# Patient Record
Sex: MALE | Race: OTHER
[De-identification: names, ages, dates, MRNs, and addresses within clinical notes are randomized per-mention and may not be internally consistent; named-entity substitution may affect disease eponyms.]

---

## 2018-11-09 ENCOUNTER — HOSPITAL ENCOUNTER (INPATIENT)
Dept: HOSPITAL 74 - YASAS | Age: 43
LOS: 4 days | Discharge: LEFT BEFORE DISCHARGE | DRG: 894 | End: 2018-11-13
Attending: INTERNAL MEDICINE | Admitting: INTERNAL MEDICINE
Payer: COMMERCIAL

## 2018-11-09 VITALS — BODY MASS INDEX: 20.2 KG/M2

## 2018-11-09 DIAGNOSIS — F17.210: ICD-10-CM

## 2018-11-09 DIAGNOSIS — F10.230: Primary | ICD-10-CM

## 2018-11-09 DIAGNOSIS — F19.24: ICD-10-CM

## 2018-11-09 DIAGNOSIS — F14.20: ICD-10-CM

## 2018-11-09 DIAGNOSIS — B18.2: ICD-10-CM

## 2018-11-09 DIAGNOSIS — F13.230: ICD-10-CM

## 2018-11-09 DIAGNOSIS — I45.81: ICD-10-CM

## 2018-11-09 DIAGNOSIS — F11.20: ICD-10-CM

## 2018-11-09 PROCEDURE — HZ2ZZZZ DETOXIFICATION SERVICES FOR SUBSTANCE ABUSE TREATMENT: ICD-10-PCS | Performed by: INTERNAL MEDICINE

## 2018-11-09 RX ADMIN — Medication SCH MG: at 22:22

## 2018-11-09 NOTE — CONSULT
[facility] Psychiatric Consult





- Data


Date of interview: 11/09/18


Admission source: [facility]


Identifying data: Patient is a 43 year old  male, father of one, 

domiciled, and employed as a [employer]. This is one of multiple admissions for 

patient. Patient seeking detox for alcohol and cocaine dependence.


Substance Abuse History: Alcohol/beer- 4-5 beers daily.  Cocaine- 2 bags daily.

  CIgarette- 1/2 pack daily


Psychiatric History: Writer called to speak to patient in [facility] due to risk of 

suicidality.  Patient denies h/o psychiatric hospitalization, outpatient care, 

and suicide attempt. Pt. is currently in the methadone program at the [facility] in the [place], [place]. Patient denies psychotic symptoms. No psychosis noted. 

Patient stated " I am currently going through a phase of feeeling down because 

i am using and i am here to get help." Patient reports poor sleep and a lost of 

7-10 pounds in 2-3 months. He denies amotivation, anhedonia, and suicidal 

ideation. HIs protective factors is his mother, wife, and daughter. Patient 

denies urges or thoughts to hurt himself or others. Patient is stable for 

admission to detox.


Physical/Sexual Abuse/Trauma History: denies.





Mental Status Exam





- Mental Status Exam


Alert and Oriented to: Time, Place, Person


Cognitive Function: Good


Patient Appearance: Well Groomed


Mood: Hopeful, Euthymic


Affect: Appropriate, Mood Congruent


Patient Behavior: Appropriate, Cooperative


Speech Pattern: Clear, Appropriate


Voice Loudness: Normal


Thought Process: Intact, Goal Oriented


Hallucinations: Denies


Suicidal Ideation: Denies


Homicidal Ideation: Denies


Insight/Judgement: Poor


Sleep: Poorly


Appetite: Fair


Muscle strength/Tone: Normal


Gait/Station: Normal





Psychiatric Findings





- Problem List (Axis 1, 2,3)


(1) Substance induced mood disorder


Current Visit: Yes   Status: Acute   





(2) Alcohol dependence


Current Visit: Yes   Status: Acute   





(3) Cocaine dependence


Current Visit: Yes   Status: Acute   





- Initial Treatment Plan


Initial Treatment Plan: Psychoeducation provided. Detoxification in progress. 

Patient informed that melatonin 5mg will be ordered for insomnia.

## 2018-11-09 NOTE — HP
CIWA Score


Nausea/Vomitin-No Nausea/No Vomiting


Muscle Tremors: 4-Moderate,w/Arms Extend


Anxiety: 2


Agitation: 0-Normal Activity


Paroxysmal Sweats: 1-Minimal Palms Moist


Orientation: 1-Uncertain about Date


Tacttile Disturbances: 0-None


Auditory Disturbances: 0-None


Visual Disturbances: 0-None


Headache: 2-Mild


CIWA-Ar Total Score: 10





- Admission Criteria


[facility] Guidelines: Admission for Medically Managed Detox: 


Requires at least one of the followin. CIWA greater than 12


2. Seizures within the past 24 hours


3. Delirium tremens within the past 24 hours


4. Hallucinations within the past 24 hours


5. Acute intervention needed for co  occurring medical disorder


6. Acute intervention needed for co  occurring psychiatric disorder


7. Severe withdrawal that cannot be handled at a lower level of care (continued


    vomiting, continued diarrhea, abnormal vital signs) requiring intravenous


    medication and/or fluids


8. Pregnancy








Admission ROS [facility]





- [facility]


Chief Complaint: 





Having alcohol and Xanax withdrawal. Here for detox.


Allergies/Adverse Reactions: 


 Allergies











Allergy/AdvReac Type Severity Reaction Status Date / Time


 


No Known Allergies Allergy   Verified 16 12:00











History of Present Illness: 


 Alcohol use began at age 20.


Cocaine use began at age 20.


Nicotine use began at age 14.


Heroin use began at age 16. Has been on Methadone maintenance x 11 years. 


Benzodiazepine use began at age 40.





Denies hx of seizures, blackouts or overdose.





Longest length of sobriety is 5 years.








Search Terms: Alejandro Grier, 1975 


Search Date: 2018 07:01:46 PM 


The Drug Utilization Report below displays all of the controlled substance 

prescriptions, if any, that your patient has filled in the last twelve months. 

The information displayed on this report is compiled from pharmacy submissions 

to the Department, and accurately reflects the information as submitted by the 

pharmacies.


This report was requested by: Leydi Foreman | Reference #: [id number] 


There are no results for the search terms that you entered.











Exam Limitations: No Limitations





- Ebola screening


Have you traveled outside of the country in the last 21 days: No


Have you had contact with anyone from an Ebola affected area: No


Have you been sick,other than usual withdrawal symptoms: No


Do you have a fever: No





- Review of Systems


Constitutional: Diaphoresis (Some sweating.), Changes in sleep (Difficulty 

falling asleep.)


EENT: reports: Blurred Vision (Wears glasses)


Respiratory: reports: No Symptoms reported


Cardiac: reports: No Symptoms Reported


GI: reports: Poor Appetite


: reports: No Symptoms Reported


Musculoskeletal: reports: No Symptoms Reported


Integumentary: reports: No Symptoms Reported


Neuro: reports: Headache (Mild)


Endocrine: reports: Increased Thirst


Hematology: reports: No Symptoms Reported


Psychiatric: reports: Judgement Intact, Mood/Affect Appropiate, Orientated x3 (

Off date by 1 day), Anxious





Patient History





- Patient Medical History


Hx Anemia: No


Hx Asthma: No


Hx Chronic Obstructive Pulmonary Disease (COPD): No


Hx Cardiac Disorders: No


Hx Hypertension: No


Hx Hypercholesterolemia: No


HX Cerebrovascular Accident: No


Hx Seizures: No


Hx Diabetes: No


Hx Gastrointestinal Disorders: No


Hx Genitourinary Disorders: No


Hx Sexually Transmitted Disorders: No


Hx Renal Disease (ESRD): No


Hx Thyroid Disease: No


Hx Human Immunodeficiency Virus (HIV): No (NEGATIVE HX)


Hx Hepatitis C: Yes ("BUT INACTIVE")


Hx Depression: Yes (NOT CURRENTLY ON MED)


Hx Suicide Attempt: No (DENIES)


Hx Bipolar Disorder: No


Hx Schizophrenia: No





- Patient Surgical History


Past Surgical History: Yes


Hx Orthopedic Surgery: Yes (Fx mandible IN )


Anesthesia Reaction: No





- PPD History


Previous Implant?: Yes


Documented Results: Negative w/proof


Implanted On Prior R Admission?: Yes


Date: 16


PPD to be Administered?: Yes





- Smoking Cessation


Smoking history: Current every day smoker


Have you smoked in the past 12 months: Yes


Aproximately how many cigarettes per day: 10


Cigars Per Day: 0


Hx Chewing Tobacco Use: No


Initiated information on smoking cessation: Yes


'Breaking Loose' booklet given: 18





- Substance & Tx. History


Hx Alcohol Use: Yes


Hx Substance Use: Yes


Substance Use Type: Alcohol, Cocaine, Heroin


Hx Substance Use Treatment: Yes (detox, rehab, currently on MMTP program)





- Substances Abused


  ** Alcohol


Route: Oral


Frequency: Daily


Amount used: 1 pint of hennesey, 12 -12oz cans of beer


Age of first use: 20


Date of Last Use: 18





  ** Alprazolam (Xanax)


Route: Oral


Frequency: 1-2 times per week


Amount used: 2mg


Age of first use: 40


Date of Last Use: 18





  ** Cocaine


Route: Injection


Frequency: 3-6 times per week


Amount used: $60


Age of first use: 20


Date of Last Use: 18





Family Disease History





- Family Disease History


Family Disease History: Diabetes: Mother (HTN & DEPRESSION), Other: Father, 

Mother





Admission Physical Exam [facility]





- Vital Signs


Vital Signs: 


 Vital Signs - 24 hr











  18





  14:27


 


Temperature 97.6 F


 


Pulse Rate 64


 


Respiratory 18





Rate 


 


Blood Pressure 120/74














- Physical


General Appearance: Yes: Mild Distress, Tremorous, Sweating, Anxious


HEENTM: Yes: EOMI, Hearing grossly Normal, Normocephalic, ANABELLE, Pharynx Normal


Respiratory: Yes: Chest Non-Tender, Lungs Clear, Normal Breath Sounds, No 

Respiratory Distress


Neck: Yes: No masses,lesions,Nodules, Supple


Breast: Yes: Breast Exam Deferred


Cardiology: Yes: Regular Rhythm, Regular Rate, S1, S2, Other (Prolonged QT 

interval.)


Abdominal: Yes: Flat, Soft, Increased Bowel Sounds, Other (Generalized 

tenderness upon paplation. No guarding or rebound tenderness.)


Genitourinary: Yes: Within Normal Limits


Back: Yes: Normal Inspection


Musculoskeletal: Yes: full range of Motion, Gait Steady


Extremities: Yes: Normal Capillary Refill, Normal Range of Motion, Non-Tender, 

Tremors (Gross tremors of hands upon extension)


Neurological: Yes: CNs II-XII NML intact, Alert, Motor Strength 5/5, Normal Mood

/Affect


Integumentary: Yes: Normal Color, Dry, Warm


Lymphatic: Yes: Within Normal Limits





- Diagnostic


(1) Alcohol dependence with uncomplicated withdrawal


Current Visit: Yes   Status: Acute   





(2) Methadone maintenance therapy patient


Current Visit: Yes   Status: Chronic   





(3) Prolonged Q-T interval on ECG


Current Visit: Yes   Status: Chronic   





(4) Cocaine dependence, uncomplicated


Current Visit: Yes   Status: Chronic   





(5) Nicotine dependence


Current Visit: Yes   Status: Chronic   


Qualifiers: 


   Nicotine product type: cigarettes   Substance use status: uncomplicated   

Qualified Code(s): F17.210 - Nicotine dependence, cigarettes, uncomplicated   





(6) Sedative, hypnotic or anxiolytic dependence with withdrawal, uncomplicated


Current Visit: Yes   Status: Acute   





Cleared for Admission [facility]





- Detox or Rehab


[facility] Level of Care: Medically Supervised


Detox Regimen/Protocol: Librium





[facility] Breath Alcohol Content


Breath Alcohol Content: 0





Urine Drug Screen





- Results


Drug Screen Negative: No


Urine Drug Screen Results: MTD-Methadone

## 2018-11-10 LAB
ALBUMIN SERPL-MCNC: 3.6 G/DL (ref 3.4–5)
ALP SERPL-CCNC: 73 U/L (ref 45–117)
ALT SERPL-CCNC: 44 U/L (ref 13–61)
ANION GAP SERPL CALC-SCNC: 4 MMOL/L (ref 8–16)
AST SERPL-CCNC: 122 U/L (ref 15–37)
BILIRUB SERPL-MCNC: 0.5 MG/DL (ref 0.2–1)
BUN SERPL-MCNC: 14 MG/DL (ref 7–18)
CALCIUM SERPL-MCNC: 8.9 MG/DL (ref 8.5–10.1)
CHLORIDE SERPL-SCNC: 100 MMOL/L (ref 98–107)
CO2 SERPL-SCNC: 32 MMOL/L (ref 21–32)
CREAT SERPL-MCNC: 0.8 MG/DL (ref 0.55–1.3)
DEPRECATED RDW RBC AUTO: 13 % (ref 11.9–15.9)
GLUCOSE SERPL-MCNC: 86 MG/DL (ref 74–106)
HCT VFR BLD CALC: 37 % (ref 35.4–49)
HGB BLD-MCNC: 12 GM/DL (ref 11.7–16.9)
MCH RBC QN AUTO: 27.5 PG (ref 25.7–33.7)
MCHC RBC AUTO-ENTMCNC: 32.4 G/DL (ref 32–35.9)
MCV RBC: 84.8 FL (ref 80–96)
PLATELET # BLD AUTO: 301 K/MM3 (ref 134–434)
PMV BLD: 7.7 FL (ref 7.5–11.1)
POTASSIUM SERPLBLD-SCNC: 4.4 MMOL/L (ref 3.5–5.1)
PROT SERPL-MCNC: 6.8 G/DL (ref 6.4–8.2)
RBC # BLD AUTO: 4.37 M/MM3 (ref 4–5.6)
SODIUM SERPL-SCNC: 136 MMOL/L (ref 136–145)
WBC # BLD AUTO: 6.9 K/MM3 (ref 4–10)

## 2018-11-10 RX ADMIN — NICOTINE SCH MG: 21 PATCH TRANSDERMAL at 10:14

## 2018-11-10 RX ADMIN — MIRTAZAPINE SCH MG: 15 TABLET, FILM COATED ORAL at 22:40

## 2018-11-10 RX ADMIN — METHADONE HYDROCHLORIDE SCH MG: 40 TABLET ORAL at 05:43

## 2018-11-10 RX ADMIN — Medication SCH MG: at 22:39

## 2018-11-10 RX ADMIN — Medication SCH TAB: at 10:13

## 2018-11-10 NOTE — PN
[facility] Progress Note


Note: 





Psychiatry


Attending's note : 


Approached by the patient.


Complaint : insomnia.


Melatonin ineffective.


Sleep hygiene discussed with patient.


Hypnotic medications revisited.


Mr Grier declines :


trazodone, seroquel, tricyclics.


Offered mirtazapine. 


Made aware of side effects/benefits.


Agrees to take remeron 15 mg po hs.


Will follow response.

## 2018-11-10 NOTE — PN
S CIWA





- CIWA Score


Nausea/Vomitin-No Nausea/No Vomiting


Muscle Tremors: 3


Anxiety: 4-Mod. Anxious/Guarded


Agitation: 4-Moderately Restless


Paroxysmal Sweats: 1-Minimal Palms Moist


Orientation: 0-Oriented


Tacttile Disturbances: 0-None


Auditory Disturbances: 0-None


Visual Disturbances: 0-None


Headache: 0-None Present


CIWA-Ar Total Score: 12





B[facility] Progress Note (SOAP)


Subjective: 





ANXIETY,IRRITABILITY,INTERMITTENT SLEEP.


Objective: 





11/10/18 12:07


 Vital Signs











  11/10/18 11/10/18





  06:20 11:02


 


Temperature 97.4 F L 97.5 F L


 


Pulse Rate 49 L 53 L


 


Respiratory 18 16





Rate  


 


Blood Pressure 104/62 109/65








 Laboratory Tests











  11/10/18 11/10/18 11/10/18





  07:30 07:30 07:30


 


WBC  6.9  


 


RBC  4.37  


 


Hgb  12.0  


 


Hct  37.0  


 


MCV  84.8  


 


MCH  27.5  


 


MCHC  32.4  


 


RDW  13.0  


 


Plt Count  301  D  


 


MPV  7.7  


 


Sodium   136 


 


Potassium   4.4 


 


Chloride   100 


 


Carbon Dioxide   32 


 


Anion Gap   4 L 


 


BUN   14 


 


Creatinine   0.8 


 


Creat Clearance w eGFR   > 60 


 


Random Glucose   86 


 


Calcium   8.9 


 


Total Bilirubin   0.5 


 


AST   122 H 


 


ALT   44 


 


Alkaline Phosphatase   73 


 


Total Protein   6.8 


 


Albumin   3.6 


 


RPR Titer    Nonreactive








UA PENDING


Assessment: 





11/10/18 12:07


WITHDRAWAL SX


Plan: 





CONTINUE DETOX


INCREASE PO FLUIDS.

## 2018-11-11 RX ADMIN — MIRTAZAPINE SCH MG: 15 TABLET, FILM COATED ORAL at 22:08

## 2018-11-11 RX ADMIN — Medication SCH MG: at 22:08

## 2018-11-11 RX ADMIN — NICOTINE SCH MG: 21 PATCH TRANSDERMAL at 10:14

## 2018-11-11 RX ADMIN — IBUPROFEN PRN MG: 400 TABLET, FILM COATED ORAL at 22:10

## 2018-11-11 RX ADMIN — Medication SCH TAB: at 10:14

## 2018-11-11 RX ADMIN — METHADONE HYDROCHLORIDE SCH MG: 40 TABLET ORAL at 05:51

## 2018-11-11 NOTE — EKG
Test Reason : 

Blood Pressure : ***/*** mmHG

Vent. Rate : 057 BPM     Atrial Rate : 057 BPM

   P-R Int : 158 ms          QRS Dur : 086 ms

    QT Int : 476 ms       P-R-T Axes : 000 021 034 degrees

   QTc Int : 463 ms

 

SINUS BRADYCARDIA

OTHERWISE NORMAL ECG

NO PREVIOUS ECGS AVAILABLE

Confirmed by PAU RAMIREZ MD ([id number]) on 11/11/2018 7:32:28 PM

 

Referred By:             Confirmed By:PAU RAMIREZ MD

## 2018-11-11 NOTE — PN
S CIWA





- CIWA Score


Nausea/Vomitin-No Nausea/No Vomiting


Muscle Tremors: 2


Anxiety: 4-Mod. Anxious/Guarded


Agitation: 2


Paroxysmal Sweats: 2


Orientation: 0-Oriented


Tacttile Disturbances: 0-None


Auditory Disturbances: 0-None


Visual Disturbances: 0-None


Headache: 0-None Present


CIWA-Ar Total Score: 10





BHS Progress Note (SOAP)


Subjective: 





Anxious, interrupted sleep. Patient requested to see psychiatrist because his 

sleeping medication ineffective


Objective: 





18 16:37


 Last Vital Signs











Temp Pulse Resp BP Pulse Ox


 


 97.0 F L  65   18   101/56 L   


 


 18 15:00  18 15:00  18 15:00  18 15:00   








 Laboratory Tests











  11/10/18 11/10/18 11/10/18





  07:30 07:30 07:30


 


WBC  6.9  


 


RBC  4.37  


 


Hgb  12.0  


 


Hct  37.0  


 


MCV  84.8  


 


MCH  27.5  


 


MCHC  32.4  


 


RDW  13.0  


 


Plt Count  301  D  


 


MPV  7.7  


 


Sodium   136 


 


Potassium   4.4 


 


Chloride   100 


 


Carbon Dioxide   32 


 


Anion Gap   4 L 


 


BUN   14 


 


Creatinine   0.8 


 


Creat Clearance w eGFR   > 60 


 


Random Glucose   86 


 


Calcium   8.9 


 


Total Bilirubin   0.5 


 


AST   122 H 


 


ALT   44 


 


Alkaline Phosphatase   73 


 


Total Protein   6.8 


 


Albumin   3.6 


 


RPR Titer    Nonreactive








Labs reviewed


Assessment: 





18 16:38


Withdrawal sxs


Plan: 





Continue detox


Encouraged PO water intake

## 2018-11-12 LAB
APPEARANCE UR: CLEAR
BACTERIA #/AREA URNS HPF: (no result) /HPF
BILIRUB UR STRIP.AUTO-MCNC: NEGATIVE MG/DL
CAOX CRY URNS QL MICRO: (no result) /HPF
COLOR UR: YELLOW
EPITH CASTS URNS QL MICRO: (no result) /HPF
KETONES UR QL STRIP: NEGATIVE
LEUKOCYTE ESTERASE UR QL STRIP.AUTO: (no result)
MUCOUS THREADS URNS QL MICRO: (no result)
NITRITE UR QL STRIP: NEGATIVE
PH UR: 5 [PH] (ref 5–8)
PROT UR QL STRIP: NEGATIVE
PROT UR QL STRIP: NEGATIVE
SP GR UR: 1.02 (ref 1.01–1.03)
UROBILINOGEN UR STRIP-MCNC: 2 MG/DL (ref 0.2–1)

## 2018-11-12 RX ADMIN — Medication SCH TAB: at 10:32

## 2018-11-12 RX ADMIN — Medication SCH MG: at 22:10

## 2018-11-12 RX ADMIN — NICOTINE SCH MG: 21 PATCH TRANSDERMAL at 10:32

## 2018-11-12 RX ADMIN — MIRTAZAPINE SCH MG: 15 TABLET, FILM COATED ORAL at 22:10

## 2018-11-12 RX ADMIN — IBUPROFEN PRN MG: 400 TABLET, FILM COATED ORAL at 15:15

## 2018-11-12 RX ADMIN — METHADONE HYDROCHLORIDE SCH MG: 40 TABLET ORAL at 06:55

## 2018-11-12 NOTE — PN
BHS Progress Note (SOAP)


Subjective: 





interrupted sleep


tremors


Objective: 





11/12/18 17:25


A & O x 3


 Vital Signs











Temperature  97.1 F L  11/12/18 13:24


 


Pulse Rate  82   11/12/18 13:24


 


Respiratory Rate  18   11/12/18 13:24


 


Blood Pressure  120/76   11/12/18 13:24


 


O2 Sat by Pulse Oximetry (%)      











Assessment: 





11/12/18 17:25


withdrawal sx


Plan: 





continue detox

## 2018-11-13 VITALS — DIASTOLIC BLOOD PRESSURE: 70 MMHG | HEART RATE: 62 BPM | TEMPERATURE: 97.3 F | SYSTOLIC BLOOD PRESSURE: 116 MMHG

## 2018-11-13 RX ADMIN — IBUPROFEN PRN MG: 400 TABLET, FILM COATED ORAL at 02:11

## 2018-11-13 RX ADMIN — METHADONE HYDROCHLORIDE SCH MG: 40 TABLET ORAL at 05:50

## 2018-11-13 NOTE — DS
[facility] Detox Discharge Summary


Admission Date: 


11/09/18








- History


Present History: Alcohol Dependence, Cocaine Dependence, Sedative Dependence, 

MMTP


Pertinent Past History: 





Alcohol dependence





Opioid dependence on agonist





Nicotine dependence





Cocaine dependence





Sedative dependence





- Physical Exam Results


Vital Signs: 


 Vital Signs











Temperature  97.3 F L  11/13/18 06:42


 


Pulse Rate  62   11/13/18 06:42


 


Respiratory Rate  18   11/13/18 06:42


 


Blood Pressure  116/70   11/13/18 06:42


 


O2 Sat by Pulse Oximetry (%)      











Pertinent Admission Physical Exam Findings: 





Withdrawal sxs





 Laboratory Tests











  11/10/18 11/10/18 11/10/18





  07:30 07:30 07:30


 


WBC  6.9  


 


RBC  4.37  


 


Hgb  12.0  


 


Hct  37.0  


 


MCV  84.8  


 


MCH  27.5  


 


MCHC  32.4  


 


RDW  13.0  


 


Plt Count  301  D  


 


MPV  7.7  


 


Sodium   136 


 


Potassium   4.4 


 


Chloride   100 


 


Carbon Dioxide   32 


 


Anion Gap   4 L 


 


BUN   14 


 


Creatinine   0.8 


 


Creat Clearance w eGFR   > 60 


 


Random Glucose   86 


 


Calcium   8.9 


 


Total Bilirubin   0.5 


 


AST   122 H 


 


ALT   44 


 


Alkaline Phosphatase   73 


 


Total Protein   6.8 


 


Albumin   3.6 


 


Urine Color   


 


Urine Appearance   


 


Urine pH   


 


Ur Specific Gravity   


 


Urine Protein   


 


Urine Glucose (UA)   


 


Urine Ketones   


 


Urine Blood   


 


Urine Nitrite   


 


Urine Bilirubin   


 


Urine Urobilinogen   


 


Ur Leukocyte Esterase   


 


Urine WBC (Auto)   


 


Urine RBC (Auto)   


 


Ur Epithelial Cells   


 


Calcium Oxalate Crystal   


 


Urine Bacteria   


 


Urine Mucus   


 


RPR Titer    Nonreactive














  11/12/18





  11:10


 


WBC 


 


RBC 


 


Hgb 


 


Hct 


 


MCV 


 


MCH 


 


MCHC 


 


RDW 


 


Plt Count 


 


MPV 


 


Sodium 


 


Potassium 


 


Chloride 


 


Carbon Dioxide 


 


Anion Gap 


 


BUN 


 


Creatinine 


 


Creat Clearance w eGFR 


 


Random Glucose 


 


Calcium 


 


Total Bilirubin 


 


AST 


 


ALT 


 


Alkaline Phosphatase 


 


Total Protein 


 


Albumin 


 


Urine Color  Yellow


 


Urine Appearance  Clear


 


Urine pH  5.0  D


 


Ur Specific Gravity  1.023


 


Urine Protein  Negative


 


Urine Glucose (UA)  Negative


 


Urine Ketones  Negative


 


Urine Blood  Negative


 


Urine Nitrite  Negative


 


Urine Bilirubin  Negative


 


Urine Urobilinogen  2.0


 


Ur Leukocyte Esterase  Trace


 


Urine WBC (Auto)  <1


 


Urine RBC (Auto)  1


 


Ur Epithelial Cells  Rare


 


Calcium Oxalate Crystal  Rare


 


Urine Bacteria  Rare


 


Urine Mucus  Rare


 


RPR Titer 








Labs reviewed





- Treatment


Hospital Course: Detox Protocol Followed, Detoxed Safely, Responded well, 

Discharged Condition Good





- Medication


Discharge Medications: 


Ambulatory Orders





NK [No Known Home Medication]  05/04/16 











- Diagnosis


(1) Alcohol dependence with uncomplicated withdrawal


Status: Acute   





(2) Cocaine dependence


Status: Chronic   





(3) Sedative, hypnotic or anxiolytic dependence with withdrawal, uncomplicated


Status: Acute   





(4) Methadone maintenance therapy patient


Status: Chronic   





(5) Nicotine dependence


Status: Chronic   


Qualifiers: 


   Nicotine product type: cigarettes   Substance use status: uncomplicated   

Qualified Code(s): F17.210 - Nicotine dependence, cigarettes, uncomplicated   





- AMA


Did Patient Leave Against Medical Advice: No (F/U with your PCP within 1-2 weeks

)

## 2022-10-17 ENCOUNTER — HOSPITAL ENCOUNTER (INPATIENT)
Dept: HOSPITAL 74 - YASAS | Age: 47
LOS: 5 days | Discharge: HOME | DRG: 773 | End: 2022-10-22
Attending: SURGERY | Admitting: ALLERGY & IMMUNOLOGY
Payer: COMMERCIAL

## 2022-10-17 VITALS — BODY MASS INDEX: 24.4 KG/M2

## 2022-10-17 DIAGNOSIS — F32.A: ICD-10-CM

## 2022-10-17 DIAGNOSIS — F19.24: ICD-10-CM

## 2022-10-17 DIAGNOSIS — F10.230: Primary | ICD-10-CM

## 2022-10-17 DIAGNOSIS — F14.20: ICD-10-CM

## 2022-10-17 DIAGNOSIS — F17.210: ICD-10-CM

## 2022-10-17 DIAGNOSIS — F41.9: ICD-10-CM

## 2022-10-17 DIAGNOSIS — F13.230: ICD-10-CM

## 2022-10-17 DIAGNOSIS — F11.20: ICD-10-CM

## 2022-10-17 LAB
ALBUMIN SERPL-MCNC: 4 G/DL (ref 3.4–5)
ALP SERPL-CCNC: 89 U/L (ref 45–117)
ALT SERPL-CCNC: 35 U/L (ref 13–61)
ANION GAP SERPL CALC-SCNC: 8 MMOL/L (ref 8–16)
AST SERPL-CCNC: 24 U/L (ref 15–37)
BILIRUB SERPL-MCNC: 0.6 MG/DL (ref 0.2–1)
BUN SERPL-MCNC: 22 MG/DL (ref 7–18)
CALCIUM SERPL-MCNC: 10 MG/DL (ref 8.5–10.1)
CHLORIDE SERPL-SCNC: 105 MMOL/L (ref 98–107)
CO2 SERPL-SCNC: 29 MMOL/L (ref 21–32)
CREAT SERPL-MCNC: 0.9 MG/DL (ref 0.55–1.3)
DEPRECATED RDW RBC AUTO: 13.1 % (ref 11.9–15.9)
GLUCOSE SERPL-MCNC: 100 MG/DL (ref 74–106)
HCT VFR BLD CALC: 37.1 % (ref 35.4–49)
HGB BLD-MCNC: 12 GM/DL (ref 11.7–16.9)
MCH RBC QN AUTO: 27.1 PG (ref 25.7–33.7)
MCHC RBC AUTO-ENTMCNC: 32.4 G/DL (ref 32–35.9)
MCV RBC: 83.8 FL (ref 80–96)
PLATELET # BLD AUTO: 380 10^3/UL (ref 134–434)
PMV BLD: 8.2 FL (ref 7.5–11.1)
PROT SERPL-MCNC: 7.4 G/DL (ref 6.4–8.2)
RBC # BLD AUTO: 4.43 M/MM3 (ref 4–5.6)
SODIUM SERPL-SCNC: 142 MMOL/L (ref 136–145)
WBC # BLD AUTO: 6.1 K/MM3 (ref 4–10)

## 2022-10-17 PROCEDURE — U0003 INFECTIOUS AGENT DETECTION BY NUCLEIC ACID (DNA OR RNA); SEVERE ACUTE RESPIRATORY SYNDROME CORONAVIRUS 2 (SARS-COV-2) (CORONAVIRUS DISEASE [COVID-19]), AMPLIFIED PROBE TECHNIQUE, MAKING USE OF HIGH THROUGHPUT TECHNOLOGIES AS DESCRIBED BY CMS-2020-01-R: HCPCS

## 2022-10-17 PROCEDURE — U0005 INFEC AGEN DETEC AMPLI PROBE: HCPCS

## 2022-10-17 PROCEDURE — HZ2ZZZZ DETOXIFICATION SERVICES FOR SUBSTANCE ABUSE TREATMENT: ICD-10-PCS | Performed by: SURGERY

## 2022-10-17 RX ADMIN — NICOTINE SCH: 7 PATCH TRANSDERMAL at 12:36

## 2022-10-17 RX ADMIN — Medication SCH TAB: at 12:36

## 2022-10-17 RX ADMIN — Medication SCH MG: at 22:30

## 2022-10-18 RX ADMIN — Medication SCH MG: at 22:27

## 2022-10-18 RX ADMIN — Medication SCH MG: at 22:26

## 2022-10-18 RX ADMIN — Medication SCH TAB: at 10:20

## 2022-10-18 RX ADMIN — NICOTINE SCH: 7 PATCH TRANSDERMAL at 10:21

## 2022-10-19 RX ADMIN — Medication SCH MG: at 22:13

## 2022-10-19 RX ADMIN — Medication SCH TAB: at 10:33

## 2022-10-19 RX ADMIN — Medication SCH MG: at 22:14

## 2022-10-19 RX ADMIN — METHOCARBAMOL PRN MG: 500 TABLET ORAL at 22:12

## 2022-10-19 RX ADMIN — NICOTINE SCH: 7 PATCH TRANSDERMAL at 10:33

## 2022-10-20 RX ADMIN — Medication SCH TAB: at 10:39

## 2022-10-20 RX ADMIN — IBUPROFEN PRN MG: 600 TABLET, FILM COATED ORAL at 02:31

## 2022-10-20 RX ADMIN — IBUPROFEN PRN MG: 600 TABLET, FILM COATED ORAL at 15:59

## 2022-10-20 RX ADMIN — Medication SCH MG: at 22:25

## 2022-10-20 RX ADMIN — METHOCARBAMOL PRN MG: 500 TABLET ORAL at 22:25

## 2022-10-20 RX ADMIN — NICOTINE SCH: 7 PATCH TRANSDERMAL at 10:39

## 2022-10-21 VITALS — RESPIRATION RATE: 16 BRPM

## 2022-10-21 RX ADMIN — IBUPROFEN PRN MG: 600 TABLET, FILM COATED ORAL at 20:35

## 2022-10-21 RX ADMIN — IBUPROFEN PRN MG: 600 TABLET, FILM COATED ORAL at 01:23

## 2022-10-21 RX ADMIN — NICOTINE SCH MG: 7 PATCH TRANSDERMAL at 10:11

## 2022-10-21 RX ADMIN — Medication SCH TAB: at 10:11

## 2022-10-21 RX ADMIN — Medication SCH MG: at 22:16

## 2022-10-22 VITALS — HEART RATE: 75 BPM | DIASTOLIC BLOOD PRESSURE: 59 MMHG | SYSTOLIC BLOOD PRESSURE: 98 MMHG

## 2022-10-22 VITALS — TEMPERATURE: 97.7 F

## 2022-10-22 RX ADMIN — NICOTINE SCH: 7 PATCH TRANSDERMAL at 10:14

## 2022-10-22 RX ADMIN — Medication SCH TAB: at 10:14
